# Patient Record
Sex: MALE | Race: WHITE | Employment: FULL TIME | URBAN - METROPOLITAN AREA
[De-identification: names, ages, dates, MRNs, and addresses within clinical notes are randomized per-mention and may not be internally consistent; named-entity substitution may affect disease eponyms.]

---

## 2019-10-13 ENCOUNTER — HOSPITAL ENCOUNTER (EMERGENCY)
Facility: CLINIC | Age: 24
Discharge: HOME | End: 2019-10-14
Attending: EMERGENCY MEDICINE | Admitting: EMERGENCY MEDICINE

## 2019-10-13 VITALS
HEIGHT: 76 IN | RESPIRATION RATE: 20 BRPM | TEMPERATURE: 98 F | DIASTOLIC BLOOD PRESSURE: 67 MMHG | OXYGEN SATURATION: 99 % | WEIGHT: 215 LBS | SYSTOLIC BLOOD PRESSURE: 140 MMHG | BODY MASS INDEX: 26.18 KG/M2 | HEART RATE: 94 BPM

## 2019-10-13 DIAGNOSIS — M54.50 ACUTE LEFT-SIDED LOW BACK PAIN WITHOUT SCIATICA: ICD-10-CM

## 2019-10-13 PROCEDURE — 99283 EMERGENCY DEPT VISIT LOW MDM: CPT

## 2019-10-13 RX ORDER — ACETAMINOPHEN 500 MG
1000 TABLET ORAL ONCE
Status: COMPLETED | OUTPATIENT
Start: 2019-10-13 | End: 2019-10-14

## 2019-10-13 RX ORDER — IBUPROFEN 600 MG/1
600 TABLET, FILM COATED ORAL ONCE
Status: COMPLETED | OUTPATIENT
Start: 2019-10-13 | End: 2019-10-14

## 2019-10-13 ASSESSMENT — ENCOUNTER SYMPTOMS
MYALGIAS: 0
FEVER: 0
BACK PAIN: 1
HEMATURIA: 0
DIFFICULTY URINATING: 0

## 2019-10-13 ASSESSMENT — MIFFLIN-ST. JEOR: SCORE: 2071.73

## 2019-10-13 NOTE — ED AVS SNAPSHOT
Emergency Department  [address] [place] [address]  Phone:  [phone]  Fax:  [phone]                                    Sal Ayon   MRN: [id number]    Department:   Emergency Department   Date of Visit:  10/13/2019           After Visit Summary Signature Page    I have received my discharge instructions, and my questions have been answered. I have discussed any challenges I see with this plan with the nurse or doctor.    ..........................................................................................................................................  Patient/Patient Representative Signature      ..........................................................................................................................................  Patient Representative Print Name and Relationship to Patient    ..................................................               ................................................  Date                                   Time    ..........................................................................................................................................  Reviewed by Signature/Title    ...................................................              ..............................................  Date                                               Time          22EPIC Rev 08/18

## 2019-10-14 LAB
ALBUMIN UR-MCNC: NEGATIVE MG/DL
APPEARANCE UR: CLEAR
BILIRUB UR QL STRIP: NEGATIVE
COLOR UR AUTO: ABNORMAL
GLUCOSE UR STRIP-MCNC: NEGATIVE MG/DL
HGB UR QL STRIP: NEGATIVE
KETONES UR STRIP-MCNC: NEGATIVE MG/DL
LEUKOCYTE ESTERASE UR QL STRIP: NEGATIVE
NITRATE UR QL: NEGATIVE
PH UR STRIP: 6 PH (ref 5–7)
RBC #/AREA URNS AUTO: <1 /HPF (ref 0–2)
SOURCE: ABNORMAL
SP GR UR STRIP: 1 (ref 1–1.03)
UROBILINOGEN UR STRIP-MCNC: NORMAL MG/DL (ref 0–2)
WBC #/AREA URNS AUTO: 1 /HPF (ref 0–5)

## 2019-10-14 PROCEDURE — 81001 URINALYSIS AUTO W/SCOPE: CPT | Performed by: EMERGENCY MEDICINE

## 2019-10-14 PROCEDURE — 25000132 ZZH RX MED GY IP 250 OP 250 PS 637: Performed by: EMERGENCY MEDICINE

## 2019-10-14 PROCEDURE — 87591 N.GONORRHOEAE DNA AMP PROB: CPT | Performed by: EMERGENCY MEDICINE

## 2019-10-14 PROCEDURE — 87491 CHLMYD TRACH DNA AMP PROBE: CPT | Performed by: EMERGENCY MEDICINE

## 2019-10-14 RX ADMIN — IBUPROFEN 600 MG: 600 TABLET, FILM COATED ORAL at 00:04

## 2019-10-14 RX ADMIN — ACETAMINOPHEN 1000 MG: 500 TABLET, FILM COATED ORAL at 00:04

## 2019-10-14 ASSESSMENT — ENCOUNTER SYMPTOMS
ABDOMINAL PAIN: 0
SHORTNESS OF BREATH: 0

## 2019-10-14 NOTE — ED NOTES
Patient states his pain has greatly improved from tylenol and ibuprofen. Pt given paper with [facility] resources to follow up with.

## 2019-10-14 NOTE — ED TRIAGE NOTES
Pt reports L lower back pain that occurred 2 weeks ago at work, pain worsening and now traveling into groin. Pt denies urinary sx, denies n/v/d. Pt states he has been using marijuana and cbd for pain management

## 2019-10-14 NOTE — ED PROVIDER NOTES
"  History     Chief Complaint:  Back Pain    HPI   Sal Ayon is a 24 year old male who presents with back pain and left testicular pain. The patient reports that he felt like he pulled his back a few weeks ago at work and has continued to have lower left back pain since. He thought this pain would be alleviated with time, but it has remained the same and he has developed some discomfort to his left testicle. The patient associates the timing of his left testicular pain with the timing of his back pain. He notes he has not taken any medication for his symptoms but has smoked marijuana with relief of his pain. He denies fever, difficulty urinating, myalgias, and hematuria.     Allergies:  No known drug allergies    Medications:    The patient is not currently taking any prescribed medications.    Past Medical History:    Atrial fibrillation    Past Surgical History:    History reviewed. No pertinent surgical history.    Family History:    History reviewed. No pertinent family history.     Social History:  Smoking status: Yes, 0.50 packs/day  Alcohol use: Yes, daily  Drug use: Yes, Marijuana  PCP: Physician No Ref-Primary    Review of Systems   Constitutional: Negative for fever.   Respiratory: Negative for shortness of breath.    Cardiovascular: Negative for chest pain.   Gastrointestinal: Negative for abdominal pain.   Genitourinary: Positive for testicular pain. Negative for difficulty urinating and hematuria.   Musculoskeletal: Positive for back pain. Negative for myalgias.   All other systems reviewed and are negative.        Physical Exam     Patient Vitals for the past 24 hrs:   BP Temp Temp src Pulse Heart Rate Resp SpO2 Height Weight   10/13/19 2348 (!) 140/67 -- -- -- -- -- -- -- --   10/13/19 2346 -- 98  F (36.7  C) Temporal 94 94 20 99 % 1.93 m (6' 4\") 97.5 kg (215 lb)     Physical Exam  General: Appears well-developed and well-nourished.   Head: No signs of trauma.   CV: Normal rate and regular " rhythm.    Resp: Effort normal and breath sounds normal. No respiratory distress.   GI: Soft. There is no tenderness.  No rebound or guarding.  Normal bowel sounds.  No CVA tenderness.  :  No testicular tenderness, masses, or discharge.  +cremasteric reflex  MSK: Mild left SI region/left lower paraspinal lumbar tenderness.  No midline tenderness.  Normal range of motion. no edema. No Calf tenderness.  Neuro: The patient is alert and oriented.  Strength in upper/lower extremities normal and symmetrical. 5/5 strength to bilateral dorsi/plantar flexion of ankles/great toes bilaterally.  Sensation normal including in saddle region. Speech normal.  GCS 15  Skin: Skin is warm and dry. No rash noted.   Psych: normal mood and affect. behavior is normal.       Emergency Department Course   Laboratory:  UA: Specific Gravity 1.002 (L), o/w Negative    Interventions:  0004: Tylenol 1000 mg PO  0004: Ibuprofen 600 mg PO     Emergency Department Course:  Past medical records, nursing notes, and vitals reviewed.  2352: I performed an exam of the patient and obtained history, as documented above.  The patient provided a urine sample here in the emergency department. This was sent for laboratory testing, findings above.    0035: I rechecked the patient. Explained findings to the patient.    0120: I rechecked the patient.  Findings and plan explained to the Patient. Patient discharged home with instructions regarding supportive care, medications, and reasons to return. The importance of close follow-up was reviewed.     Impression & Plan    Medical Decision Making:  Sal Ayon is a 24 year old gentleman who presents due to left low back pain.  He is an  and states the pain began at work couple weeks ago.  In my evaluation, he did have some mild tenderness to the left lumbar paraspinal region.  There is no midline tenderness and he was neurovascularly intact distally.  He did report having some pain wrapping  around to the groin area.   exam was normal. Clinically I have a low suspicion for testicular pathology.  UA did not show any signs of blood in the urine or infection, and clinically, I have a low suspicion for kidney stone.  He was given Tylenol and ibuprofen and did feel considerably better.  He is recommended to continue with Tylenol and ibuprofen and to follow-up with his primary care doctor. Return to the ER for any further concerns.    Diagnosis:    ICD-10-CM   1. Acute left-sided low back pain without sciatica M54.5     Disposition:  discharged to home    Jeovany Jefferson  10/13/2019    EMERGENCY DEPARTMENT  I, Jeovany Jefferson, am serving as a scribe at 11:52 PM on 10/13/2019 to document services personally performed by Praful Rodriguez MD based on my observations and the provider's statements to me.      Praful Rodriguez MD  10/14/19 0417

## 2019-10-15 LAB
C TRACH DNA SPEC QL NAA+PROBE: NEGATIVE
N GONORRHOEA DNA SPEC QL NAA+PROBE: NEGATIVE
SPECIMEN SOURCE: NORMAL
SPECIMEN SOURCE: NORMAL

## 2020-01-03 ENCOUNTER — HOSPITAL ENCOUNTER (EMERGENCY)
Facility: CLINIC | Age: 25
Discharge: HOME | End: 2020-01-03
Attending: PHYSICIAN ASSISTANT | Admitting: PHYSICIAN ASSISTANT

## 2020-01-03 VITALS
HEIGHT: 76 IN | WEIGHT: 200 LBS | TEMPERATURE: 99.4 F | SYSTOLIC BLOOD PRESSURE: 144 MMHG | HEART RATE: 111 BPM | DIASTOLIC BLOOD PRESSURE: 54 MMHG | BODY MASS INDEX: 24.36 KG/M2 | RESPIRATION RATE: 18 BRPM | OXYGEN SATURATION: 98 %

## 2020-01-03 DIAGNOSIS — J10.1 INFLUENZA A: ICD-10-CM

## 2020-01-03 LAB
FLUAV+FLUBV AG SPEC QL: NEGATIVE
FLUAV+FLUBV AG SPEC QL: POSITIVE
SPECIMEN SOURCE: ABNORMAL

## 2020-01-03 PROCEDURE — 99283 EMERGENCY DEPT VISIT LOW MDM: CPT

## 2020-01-03 PROCEDURE — 87804 INFLUENZA ASSAY W/OPTIC: CPT | Performed by: EMERGENCY MEDICINE

## 2020-01-03 RX ORDER — OSELTAMIVIR PHOSPHATE 75 MG/1
75 CAPSULE ORAL 2 TIMES DAILY
Qty: 10 CAPSULE | Refills: 0 | Status: SHIPPED | OUTPATIENT
Start: 2020-01-03 | End: 2020-01-08

## 2020-01-03 ASSESSMENT — ENCOUNTER SYMPTOMS
COUGH: 1
DIARRHEA: 0
VOMITING: 0
FEVER: 1
NAUSEA: 0
MYALGIAS: 1

## 2020-01-03 ASSESSMENT — MIFFLIN-ST. JEOR: SCORE: 2003.69

## 2020-01-03 NOTE — ED AVS SNAPSHOT
Emergency Department  [address] [facility] [address]  Phone:  [phone]  Fax:  [phone]                                    Sal Ayon   MRN: [id number]    Department:   Emergency Department   Date of Visit:  1/3/2020           After Visit Summary Signature Page    I have received my discharge instructions, and my questions have been answered. I have discussed any challenges I see with this plan with the nurse or doctor.    ..........................................................................................................................................  Patient/Patient Representative Signature      ..........................................................................................................................................  Patient Representative Print Name and Relationship to Patient    ..................................................               ................................................  Date                                   Time    ..........................................................................................................................................  Reviewed by Signature/Title    ...................................................              ..............................................  Date                                               Time          22EPIC Rev 08/18

## 2020-01-04 NOTE — ED PROVIDER NOTES
"  History     Chief Complaint:  Flu Symptoms    HPI   Sal Ayon is a 24 year old male who presents with for evaluation of cough, fever, and myalgias.  The patient states that he started having a cough yesterday, which was accompanied with body aches, and fever today, prompting his evaluation.  He is concerned about a pneumonia.  Denies any sick contacts, recent travel.  Denies any exposure to influenza.  He denies getting immunized for influenza.  He does have a history of asthma, but states it is not been an issue for many years.  He denies vomiting, diarrhea, or abdominal pain.  no further concerns voiced at this time.    Allergies:  Allergies   Allergen Reactions     Animal Dander Itching     Eyes swelling; CATS         Medications:    none    Problem List:    There are no active problems to display for this patient.       Past Medical History:    Past Medical History:   Diagnosis Date     Atrial fibrillation (H)      Uncomplicated asthma        Past Surgical History:    Past Surgical History:   Procedure Laterality Date     CARDIOVERSION         Family History:    History reviewed. No pertinent family history.    Social History:  Marital Status:  Single [1]  Social History     Tobacco Use     Smoking status: Current Every Day Smoker     Packs/day: 0.50     Types: Vaping Device     Smokeless tobacco: Never Used   Substance Use Topics     Alcohol use: Yes     Comment: daily     Drug use: Yes     Types: Marijuana        Review of Systems   Constitutional: Positive for fever.   Respiratory: Positive for cough.    Gastrointestinal: Negative for diarrhea, nausea and vomiting.   Musculoskeletal: Positive for myalgias.   All other systems reviewed and are negative.    Physical Exam   First Vitals:  BP: (!) 144/54  Pulse: 111  Temp: 99.4  F (37.4  C)  Resp: 18  Height: 193 cm (6' 4\")  Weight: 90.7 kg (200 lb)  SpO2: 98 %    Physical Exam  General: Resting comfortably.  Alert.  Head:  The scalp, face, and head appear " normal   Eyes:  Conjunctivae and sclerae are normal    ENT:    The oropharynx is normal     Uvula is in the midline       Structures are symmetric. No tonsillar hypertrophy or exudate.     Bilateral TMs are normal without evidence of infection   Neck:  No lymphadenopathy   CV:  Regular rate and rhythm     Normal S1/S2   Resp:  Lungs are clear to auscultation    Non-labored    No rales or wheezing   MS:  Normal muscular tone   Skin:  No rash or acute skin lesions noted   Neuro: Speech is normal and fluent.     Emergency Department Course   Laboratory:  Labs Ordered and Resulted from Time of ED Arrival Up to the Time of Departure from the ED   INFLUENZA A/B ANTIGEN - Abnormal; Notable for the following components:       Result Value    Influenza A Positive (*)     All other components within normal limits     Interventions:  none    Emergency Department Course:  The patient was seen and examined myself as above.  Nursing notes reviewed.  Vital signs reviewed.  Flu swab was obtained as above.  Results were discussed with patient, and expressed understanding.  The patient be discharged home.  Discussed importance of follow-up with primary physician in 2 to 3 days for recheck.  Symptomatic cares were also discussed.  Return precautions, and red flag symptoms were discussed and reviewed with the patient and he expressed understanding.  All questions were answered prior to discharge.  The patient understands and agrees with plan.       Impression & Plan    Medical Decision Making:  Sal Ayon is a 24 year old male who presents for evaluation of cough, fever and myalgias.  Symptoms consistent with influenza-like illness and rapid influenza testing here return positive. T within he patient is within the treatment window for Tamiflu.  He is also asthmatic, and therefore at high risk for complications, and therefore Tamiflu prescription was ordered.  He is at risk for pneumonia but no signs of this are detected on today's  visit. There is no signs of serious bacterial infection such as bacteremia, meningitis, UTI/pyelonephritis, strep pharyngitis, etc. overall, I believe the patient states be discharged home.  Discussed importance of close follow-up with primary physician in 2 to 3 days for recheck.  Symptomatic cares including Tylenol and ibuprofen for fever/body aches, increase hydration, nutrition as tolerated, and increased rest were discussed and understood.  The patient will return immediately for any uncontrolled fevers, worsening symptoms, confusion, or any other concerns.  All questions were answered prior to discharge.  The patient understands and agrees to this plan.      Diagnosis:    ICD-10-CM    1. Influenza A J10.1        Disposition:  discharged to home    Discharge Medications:  Discharge Medication List as of 1/3/2020  7:42 PM      START taking these medications    Details   oseltamivir (TAMIFLU) 75 MG capsule Take 1 capsule (75 mg) by mouth 2 times daily for 5 days, Disp-10 capsule, R-0, Local Print             Kami Edwards PA-C  1/3/2020    EMERGENCY DEPARTMENT       Kami Edwards PA-C  01/03/20 3182

## 2020-01-05 ENCOUNTER — HOSPITAL ENCOUNTER (EMERGENCY)
Facility: CLINIC | Age: 25
Discharge: HOME | End: 2020-01-05
Attending: EMERGENCY MEDICINE | Admitting: EMERGENCY MEDICINE

## 2020-01-05 VITALS
WEIGHT: 200 LBS | RESPIRATION RATE: 18 BRPM | DIASTOLIC BLOOD PRESSURE: 67 MMHG | SYSTOLIC BLOOD PRESSURE: 142 MMHG | TEMPERATURE: 98.1 F | HEIGHT: 76 IN | OXYGEN SATURATION: 95 % | HEART RATE: 107 BPM | BODY MASS INDEX: 24.36 KG/M2

## 2020-01-05 DIAGNOSIS — J45.901 MILD ASTHMA WITH EXACERBATION, UNSPECIFIED WHETHER PERSISTENT: ICD-10-CM

## 2020-01-05 DIAGNOSIS — J10.1 INFLUENZA A: ICD-10-CM

## 2020-01-05 PROCEDURE — 25000125 ZZHC RX 250: Performed by: EMERGENCY MEDICINE

## 2020-01-05 PROCEDURE — 99283 EMERGENCY DEPT VISIT LOW MDM: CPT | Mod: 25

## 2020-01-05 PROCEDURE — 25000131 ZZH RX MED GY IP 250 OP 636 PS 637: Performed by: EMERGENCY MEDICINE

## 2020-01-05 PROCEDURE — 94640 AIRWAY INHALATION TREATMENT: CPT

## 2020-01-05 PROCEDURE — 25000132 ZZH RX MED GY IP 250 OP 250 PS 637: Performed by: EMERGENCY MEDICINE

## 2020-01-05 RX ORDER — PREDNISONE 20 MG/1
40 TABLET ORAL ONCE
Status: COMPLETED | OUTPATIENT
Start: 2020-01-05 | End: 2020-01-05

## 2020-01-05 RX ORDER — ACETAMINOPHEN 325 MG/1
650 TABLET ORAL ONCE
Status: COMPLETED | OUTPATIENT
Start: 2020-01-05 | End: 2020-01-05

## 2020-01-05 RX ORDER — IPRATROPIUM BROMIDE AND ALBUTEROL SULFATE 2.5; .5 MG/3ML; MG/3ML
3 SOLUTION RESPIRATORY (INHALATION) ONCE
Status: COMPLETED | OUTPATIENT
Start: 2020-01-05 | End: 2020-01-05

## 2020-01-05 RX ORDER — PREDNISONE 20 MG/1
TABLET ORAL
Qty: 10 TABLET | Refills: 0 | Status: SHIPPED | OUTPATIENT
Start: 2020-01-05

## 2020-01-05 RX ADMIN — IPRATROPIUM BROMIDE AND ALBUTEROL SULFATE 3 ML: .5; 3 SOLUTION RESPIRATORY (INHALATION) at 21:20

## 2020-01-05 RX ADMIN — ACETAMINOPHEN 650 MG: 325 TABLET, FILM COATED ORAL at 21:19

## 2020-01-05 RX ADMIN — PREDNISONE 40 MG: 20 TABLET ORAL at 21:19

## 2020-01-05 ASSESSMENT — ENCOUNTER SYMPTOMS
FATIGUE: 1
SHORTNESS OF BREATH: 1
CHILLS: 1
SORE THROAT: 0
COUGH: 1

## 2020-01-05 ASSESSMENT — MIFFLIN-ST. JEOR: SCORE: 2003.69

## 2020-01-05 NOTE — ED AVS SNAPSHOT
Emergency Department  [address] [facility] [address]  Phone:  [phone]  Fax:  [phone]                                    Sal Ayon   MRN: [id number]    Department:   Emergency Department   Date of Visit:  1/5/2020           After Visit Summary Signature Page    I have received my discharge instructions, and my questions have been answered. I have discussed any challenges I see with this plan with the nurse or doctor.    ..........................................................................................................................................  Patient/Patient Representative Signature      ..........................................................................................................................................  Patient Representative Print Name and Relationship to Patient    ..................................................               ................................................  Date                                   Time    ..........................................................................................................................................  Reviewed by Signature/Title    ...................................................              ..............................................  Date                                               Time          22EPIC Rev 08/18

## 2020-01-05 NOTE — LETTER
January 5, 2020      To Whom It May Concern:      Sal Ayon was seen in our Emergency Department today, 01/05/20.  I expect his condition to improve over the next 2 days.  He may return to work/school when improved.    Sincerely,        Melissa Cee RN

## 2020-01-06 NOTE — ED PROVIDER NOTES
"  History     Chief Complaint:  Cough    HPI   Sal Ayon is a 24 year old male with a history of atrial fibrillation and asthma who presents to the emergency department for evaluation of cough. The patient was recently diagnosed with influenza A 2 days ago. The patient was given a prescription for Tamiflu, but was unable to fill it. Since his diagnosis, the patient has had a worsening cough, prompting his visit. The patient states that he has been taking his albuterol inhaler 4 times a day. The patient has not taken any medication today other than his inhaler. He also mentions some chills, a possible fever, and shortness of breath. He denies any sore throat.     Allergies:  Animal Dander    Medications:    Albuterol    Past Medical History:    Atrial fibrillation  Asthma    Past Surgical History:    Cardioversion    Family History:    No past pertinent family history.    Social History:  The patient presents alone.  Smoking Status: Current everyday  Smokeless Tobacco: Never  Alcohol Use: Yes  Drug Use: Yes  Marital Status:  Single      Review of Systems   Constitutional: Positive for chills and fatigue.   HENT: Negative for sore throat.    Respiratory: Positive for cough and shortness of breath.    All other systems reviewed and are negative.    Physical Exam   Vitals:  Patient Vitals for the past 24 hrs:   BP Temp Temp src Pulse Resp SpO2 Height Weight   01/05/20 2216 (!) 142/67 -- -- 107 -- 95 % -- --   01/05/20 2102 135/65 98.1  F (36.7  C) Oral 118 18 94 % 1.93 m (6' 4\") 90.7 kg (200 lb)     Physical Exam  Constitutional: White male, sitting, wearing a mask, warm to touch, rare cough.  HENT: No signs of trauma.   Eyes: EOM are normal. Pupils are equal, round, and reactive to light.   Neck: Normal range of motion. No JVD present. No cervical adenopathy.  Cardiovascular: Rapid regular rhythm.  Exam reveals no gallop and no friction rub. No murmur heard.   Pulmonary/Chest: Bilateral breath sounds normal. No " rhonchi or rales. Moderate air exchange, occasional wheeze.  Abdominal: Soft. No tenderness. No rebound or guarding.   Musculoskeletal: No edema. No tenderness.   Lymphadenopathy: No lymphadenopathy.   Neurological: Alert and oriented to person, place, and time. Normal strength. Coordination normal.   Skin: Skin is warm and dry. No rash noted. No erythema.     Emergency Department Course     Interventions:  2119 Tylenol 650 mg PO  2119 Deltasone 40 mg PO  2120 Duoneb 3 mL Neb    Emergency Department Course:  Past medical records, nursing notes, and vitals reviewed.    2112 I performed an exam of the patient as documented above.     2212 I rechecked the patient and discussed the results of his workup thus far.     Findings and plan explained to the Patient. Patient discharged home with instructions regarding supportive care, medications, and reasons to return. The importance of close follow-up was reviewed. The patient was prescribed Deltasone.    I personally reviewed the laboratory results with the Patient and answered all related questions prior to discharge.    Impression & Plan      Medical Decision Making:  This is a 24 year old male who was seen in the ED 2 days ago and diagnosed with influenza A. The patient states he could not afford to get the Tamiflu. He was feeling okay yesterday, but today he has felt more short of breath. He does have asthma and did have an inhaler which he has used a couple of times. He was told to come back if he was feeling worse. On exam, he is mildly tachycardic. He does have moderate air exchange an an occasional wheeze. He is not febrile or hypoxic here. Patient received oral prednisone and one neb and is feeling much better. I will give him a spacer to use with his inhaler to make it more effective and place him on prednisone 40 a day for 5 days. I have encouraged him to take Tylenol every 4 hours which will help him with his low-grade temperature. Patient should follow up with  his primary next week. I referred him to Dr. Cha if he does not have a primary. If symptoms worsen, recheck in the ED.    Diagnosis:    ICD-10-CM    1. Mild asthma with exacerbation, unspecified whether persistent J45.901    2. Influenza A J10.1        Disposition:  discharged to home    Discharge Medications:  New Prescriptions    PREDNISONE (DELTASONE) 20 MG TABLET    Take two tablets (= 40mg) each day for 5 (five) days     ILuis Angel, am serving as a scribe on 1/5/2020 at 9:07 PM to personally document services performed by Ashok Cai MD based on my observations and the provider's statements to me.     Luis Angel Dominguez  1/5/2020    EMERGENCY DEPARTMENT       Ashok Cai MD  01/05/20 0944

## 2020-01-06 NOTE — ED TRIAGE NOTES
Diagnosed with influenza A on Friday, could not get tamaflu due to financial issues. Been on ibuprofen and felt better but today having worsening cough and having burning chest pain after each cough.

## 2020-01-12 ENCOUNTER — HOSPITAL ENCOUNTER (EMERGENCY)
Facility: CLINIC | Age: 25
Discharge: HOME | End: 2020-01-12
Attending: EMERGENCY MEDICINE | Admitting: EMERGENCY MEDICINE

## 2020-01-12 ENCOUNTER — APPOINTMENT (OUTPATIENT)
Dept: GENERAL RADIOLOGY | Facility: CLINIC | Age: 25
End: 2020-01-12
Attending: EMERGENCY MEDICINE

## 2020-01-12 VITALS
BODY MASS INDEX: 24.36 KG/M2 | RESPIRATION RATE: 16 BRPM | WEIGHT: 200 LBS | HEIGHT: 76 IN | TEMPERATURE: 98.1 F | SYSTOLIC BLOOD PRESSURE: 143 MMHG | OXYGEN SATURATION: 97 % | DIASTOLIC BLOOD PRESSURE: 54 MMHG

## 2020-01-12 DIAGNOSIS — R05.9 COUGH: ICD-10-CM

## 2020-01-12 DIAGNOSIS — R04.2 HEMOPTYSIS: ICD-10-CM

## 2020-01-12 PROCEDURE — 99283 EMERGENCY DEPT VISIT LOW MDM: CPT | Mod: 25

## 2020-01-12 PROCEDURE — 71046 X-RAY EXAM CHEST 2 VIEWS: CPT

## 2020-01-12 RX ORDER — GUAIFENESIN 600 MG/1
1200 TABLET, EXTENDED RELEASE ORAL 2 TIMES DAILY
Qty: 28 TABLET | Refills: 0 | Status: SHIPPED | OUTPATIENT
Start: 2020-01-12 | End: 2020-01-19

## 2020-01-12 RX ORDER — BENZONATATE 200 MG/1
200 CAPSULE ORAL 3 TIMES DAILY PRN
Qty: 21 CAPSULE | Refills: 0 | Status: SHIPPED | OUTPATIENT
Start: 2020-01-12 | End: 2020-01-19

## 2020-01-12 ASSESSMENT — ENCOUNTER SYMPTOMS
CHOKING: 0
SINUS PAIN: 0
PALPITATIONS: 0
WHEEZING: 0
FEVER: 0
CHILLS: 0
COUGH: 1
BLOOD IN STOOL: 0
LIGHT-HEADEDNESS: 0
UNEXPECTED WEIGHT CHANGE: 0
CHEST TIGHTNESS: 0
VOMITING: 0

## 2020-01-12 ASSESSMENT — MIFFLIN-ST. JEOR: SCORE: 2003.69

## 2020-01-12 NOTE — ED AVS SNAPSHOT
Emergency Department  [address] [facility] [address]  Phone:  [phone]  Fax:  [phone]                                    Sal Ayon   MRN: [id number]    Department:   Emergency Department   Date of Visit:  1/12/2020           After Visit Summary Signature Page    I have received my discharge instructions, and my questions have been answered. I have discussed any challenges I see with this plan with the nurse or doctor.    ..........................................................................................................................................  Patient/Patient Representative Signature      ..........................................................................................................................................  Patient Representative Print Name and Relationship to Patient    ..................................................               ................................................  Date                                   Time    ..........................................................................................................................................  Reviewed by Signature/Title    ...................................................              ..............................................  Date                                               Time          22EPIC Rev 08/18

## 2020-01-12 NOTE — ED PROVIDER NOTES
History     Chief Complaint:  Cough      HPI   Sal Ayon is a 24 year old male with history of asthma who presents with persistent cough and hemoptysis.  The patient was diagnosed with influenza A on 1/3/2020.  He returned to the ED on 1/5/2020 for asthma exacerbation, and states that he finished prednisone yesterday.  Over the last several days, he feels that his cough has actually improved.  Over the last 24 hours however he has noted multiple episodes of coughing blood.  He notes a small amount of blood-tinged sputum occasionally with some darker brown blood and casts.  He still has mild shortness of breath but overall though symptoms have improved as well.  He denies any chest pain, but does note that he has chronic muscle strain from work.  He works as an .  He denies any foreign travel, any exposure to individuals at high risk for tuberculosis, or any recent incarceration.  He does continue to smoke, but denies other drug use.  He has not had a bloody nose, and has not vomited recently.  He denies any other bleeding source, or any easy bruising.  He has no calf swelling, calf pain, recent surgeries, or prolonged sitting from travel.  He also denies any fevers or chills.  Patient does have history of atrial fibrillation but has not had any episodes recently and is not anticoagulated for that.    Allergies:    Allergies   Allergen Reactions     Animal Dander Itching     Eyes swelling; CATS         Medications:    predniSONE (DELTASONE) 20 MG tablet      Past Medical History:      Past Medical History:   Diagnosis Date     Atrial fibrillation (H)      Uncomplicated asthma        Past Surgical History:      Past Surgical History:   Procedure Laterality Date     CARDIOVERSION       Social History:  Marital Status:  Single [1]  Social History     Tobacco Use     Smoking status: Current Every Day Smoker     Packs/day: 0.50     Types: Vaping Device     Smokeless tobacco: Never Used   Substance Use  "Topics     Alcohol use: Yes     Comment: daily     Drug use: Yes     Types: Marijuana        Review of Systems   Constitutional: Negative for chills, fever and unexpected weight change.        No night sweats   HENT: Negative for nosebleeds and sinus pain.    Respiratory: Positive for cough. Negative for choking, chest tightness and wheezing.    Cardiovascular: Negative for chest pain, palpitations and leg swelling.   Gastrointestinal: Negative for blood in stool and vomiting.   Skin: Negative for rash.   Neurological: Negative for syncope and light-headedness.   All other systems reviewed and are negative.      Physical Exam   First Vitals:  BP: (!) 143/54  Heart Rate: 99  Temp: 98.1  F (36.7  C)  Resp: 16  Height: 193 cm (6' 4\")  Weight: 90.7 kg (200 lb)  SpO2: 97 %      Physical Exam  General: Hoarse voice, slightly congested.  Well-appearing.  HENT: mucous membranes moist  CV: regular rate, regular rhythm  Resp: normal effort, clear throughout, no crackles or wheezing.  No increased work of breathing.  Patient has to Kleenex is in the trash that have a small amount of pink-tinged sputum with some brown.  GI: abdomen soft and nontender, no guarding  MSK: no bony tenderness  Skin: appropriately warm and dry  Neuro: alert, clear speech, oriented  Psych: normal mood and affect    Emergency Department Course   Imaging:  Radiographic findings were communicated with the patient who voiced understanding of the findings.  XR Chest 2 Views   Final Result   IMPRESSION: Negative chest.        Emergency Department Course:  Chest radiograph obtained as above.       Impression & Plan      Medical Decision Making:  This is a 24-year-old male with history of asthma, tobacco use who presents today with cough and hemoptysis.  Lungs are clear without increased work of breathing.  Vitals are notable for slightly elevated heart rate, normal oxygen saturation, normal temperature.  I inspected the hemoptysis at the bedside, it appears " to be small amount of blood-tinged sputum.  Chest radiograph was obtained and is negative for pulmonary mass, bronchiectasis, cavitary lesions, or pneumonia.  Patient is PER C-, therefore I doubt pulmonary embolism as a cause and do not think additional testing is indicated.  Given persistent forceful coughing, I think this is likely irritation rather than more severe underlying disease.  I recommended continued supportive care with cough suppressant, humidifier, and Mucinex.  Otherwise, recommended follow-up with PCP for persistent cough.  Return to the ED for increasing hemoptysis, recurrent fever, increase difficulty breathing, or for other concerns.    Diagnosis:    ICD-10-CM    1. Hemoptysis R04.2    2. Cough R05        Disposition:  discharged to home    Discharge Medications:  New Prescriptions    BENZONATATE (TESSALON) 200 MG CAPSULE    Take 1 capsule (200 mg) by mouth 3 times daily as needed for cough    GUAIFENESIN (MUCINEX) 600 MG 12 HR TABLET    Take 2 tablets (1,200 mg) by mouth 2 times daily for 7 days       Alexandra Pope MD  1/12/2020    EMERGENCY DEPARTMENT       Alexandra Pope MD  01/12/20 0661

## 2020-01-12 NOTE — ED TRIAGE NOTES
States he has had a cough for a week and diagnosed with type A influenza.  Now seeing some blood spots in his sputum with coughing.